# Patient Record
(demographics unavailable — no encounter records)

---

## 2024-10-23 NOTE — ASSESSMENT
[Carbohydrate Consistent Diet] : carbohydrate consistent diet [Denosumab Therapy] : Risks  and benefits of denosumab therapy were discussed with the patient including eczema, cellulitis, osteonecrosis of the jaw and atypical femur fractures [FreeTextEntry1] : 64 y.o female with h/o postmenopausal osteoporosis, prediabetes with obesity, MNG and vitamin D def.  1. Postmenopausal osteoporosis- DEXA scan performed in 11/2023 shows spine -1.6 with 8.2% increase compared to 2022, left femoral neck 0.4 and total hip 0.7 which are stable and 1/3 radius -1.7 which is a 3.9% increase compared to 2022. Recommend medical therapy and will continue Prolia since tolerating medication. Prolia given today from office supply. Reviewed risks and benefits of Prolia including ONJ and atypical femur fractures. Also stressed importance of avoiding abrupt discontinuation of Prolia given risk of bone loss and vertebral fractures. Encouraged weight bearing activity. Discussed appropriate calcium and vitamin D intake. Repeat DEXA scan in 11/2025.  2. Prediabetes with obesity- Encouraged a carbohydrate consistent diet and exercise. Intolerant to Victoza (GLP-1 agonist). Intolerant to phentermine and topiramate. Contrave was ineffective. Will continue Zepbound 10 mg SQ weekly. Will follow up with weight loss management program.  3. MNG- Patient is euthyroid. Will continue to monitor for now. Will repeat thyroid ultrasound in 2025.  4. Vitamin D def- Normal 25 vitamin D level and will continue current supplement.  5. Adrenal nodule- Discussed pathophysiology. No clinical evidence of endocrinopathy. Will perform 1 mg overnight dexamethasone suppression test to rule out Cushings. Will check plasma metanephrines to rule out pheochromocytoma. Will check serum aldosterone and plasma renin to rule out hyperaldosteronism. Will check serum DHEAS to rule out hyperandrogenism. Will check CT scan with adrenal protocol.   Follow up in 6 months for Prolia

## 2024-10-23 NOTE — HISTORY OF PRESENT ILLNESS
[FreeTextEntry1] : 64 y.o. female with h/o postmenopausal osteoporosis, MNG and obesity with prediabetes here for follow up visit.   Had ventral and umbilical hernia repair in January 2023  In the evaluation of her hernia: CT scan discovered incidental left adrenal nodule.   CT scan on 1/4/23 shows left 1 cm indeterminate adrenal nodule  Had rectal polyp and needed resection for high grade dysplasia. C/o hot flashes. Tried Gabapentin and Paxil without help. Did not tolerate Effexor. Doing Horizant which has been helping. Had prolonged PTT and tested positive for anticardiolipin antibody.   In regard to MNG, reports sometimes food getting stuck when swallowing. C/o fatigue.   Had FNA of left nodule in 3/2012 c/w colloid nodule and 2 right nodules in 3/2011 which were benign.   Thyroid ultrasound on 3/6/17 showed stable b/l multiple nodules with largest on the right 1.1 cm. Largest on the left is 1.0 cm and new nodule 0.4 cm on the isthmus.  Thyroid ultrasound on 8/17/18 shows MNG with stable nodules largest on the right is 1.2 cm and on the left is 1.2 cm with no abnormal LNs.  Had CT scan on 11/2/18 with no tracheal narrowing or encroachment upon the esophagus.  Thyroid ultrasound in August 2019 showed enlarged gland with heterogenous echotexture with stable b/l nodules largest on the right measuring 1.2 cm in the midpole and largest on the left measuring 0.94 cm in the upper pole. No abnormal LNs are seen. Isthmus nodule is no longer visualized.  Thyroid ultrasound on 2/23/22 showed right 1.3 cm nodule with echogenic foci (TR5), right 1.2 cm lower pole nodule with echogenic foci (TR5), and dominant left 1.1 cm cystic nodule in the upper pole. No abnormal LNs are seen. Thyroid ultrasound in December 2023 showed stable scattered right spongiform benign appearing nodules and colloid cysts measuring up to 1.7 cm in the mid pole (TR1) and stable scattered left benign appearing spongiform nodules and colloid cysts measuring up to 1.6 cm in the mid pole (TR1) no abnormal LNs.     In regard to osteoporosis, was on a drug holiday from May 2015 until March 2018. Treated with Alendronate from 2/2011 through 5/2015. Restarted Alendronate 70 mg po weekly in March 2018 and then stopped secondary to indigestion in October 2019.   Started Prolia in March 2020 and tolerating it. Had fall in 2020 and developed left knee meniscus tear. But no fractures. Taking calcium with vitamin D 1 daily. UTD with dentist and no issues now. No jaw or bone pain. Doing pilates and small group .  DEXA scan on 2/23/15 shows spine -2.1, and total hip 0.5, 1/3 radius -1.2.  DEXA scan in 3/2017 shows stable spine -2.2, left femoral neck -0.5 and total hip 0.3 and 1/3 radius -1.5.   DEXA scan in 3/2018 spine -2.4 with a 2.4% decrease compared to 2015, left femoral neck -0.3 and total hip which is stable and 1/3 radius -2.0 which is 4.6% decrease. DEXA scan in April 2019 shows spine -2.1 with a 3.8% increase compared to 2018, left femoral neck 0.2 with 6.6% increase and total hip 0.6 which is stable and 1/3 radius -1.9 which is stable. DEXA scan in January 2022 shows spine -2.2 with a 1.4% decrease so stable compared to 2019, left femoral neck 0.3 with 1.3% increase and total hip 0.6 which is stable and 1/3 radius -2.1 which is stable compared to 2019.  DEXA scan in November 2023 shows spine -1.6 with 8.2% increase, left femoral neck 0.4 and total hip 0.7 which is stable and 1/3 radius -1.7 which is a 3.9% increase.     In regard to prediabetes, diet could be better. She was seeing nutritionist. Tried Victoza but developed indigestion and stopped it. Had sleep medicine evaluation with moderate BEAU. Using CPAP.  In regard to obesity, seeing weight management program. Tried Contrave for 1 to 2 months but did not help. Tried phentermine and topiramate but stopped secondary to feeling jittery. Tried Wegovy 0.5 mg SQ weekly and did well with it and lost 10 to 15 pounds. However had difficulty getting it secondary to shortage.  Taking Zepbound 10 mg SQ weekly and has lost 40 pounds  Walking now.   UTD with cardiology. Had CAC score upper 100s. Taking Rosuvastatin 10 mg daily. Also takes HCTZ 12.5 mg daily.

## 2024-10-23 NOTE — PHYSICAL EXAM
[Alert] : alert [No Acute Distress] : no acute distress [Normal Sclera/Conjunctiva] : normal sclera/conjunctiva [EOMI] : extra ocular movement intact [No Respiratory Distress] : no respiratory distress [Clear to Auscultation] : lungs were clear to auscultation bilaterally [Normal S1, S2] : normal S1 and S2 [Regular Rhythm] : with a regular rhythm [No Edema] : no peripheral edema [Normal Bowel Sounds] : normal bowel sounds [Not Tender] : non-tender [Not Distended] : not distended [Soft] : abdomen soft [Normal Anterior Cervical Nodes] : no anterior cervical lymphadenopathy [No Spinal Tenderness] : no spinal tenderness [No Clubbing, Cyanosis] : no clubbing  or cyanosis of the fingernails [No Rash] : no rash [Normal Reflexes] : deep tendon reflexes were 2+ and symmetric [Normal Affect] : the affect was normal [Normal Mood] : the mood was normal [Kyphosis] : no kyphosis present [de-identified] : PEMA

## 2024-10-23 NOTE — DATA REVIEWED
[FreeTextEntry1] : Labs 10/11/24 25 vitamin D 49 chol 116 HDL 46 LDL 53 tri 85 glucose 77 BUN/cr 12/.89 calcium 9.8 LFTs normal Hba1c 5.7% TSH 0.60 Free T4 1.3 H/H 13.8/42.3   3/2/18 chol 234 tri 171 HDL 48  Hba1c 5.9% calcium 9.8 BUN/cr 14/0.9  2/1/19 chol 221 tri 154 HDL 48  glucose 111  calcium 9.9 BUn/cr 15/0.9 9/2018 Hba1c 5.6% TSH 0.97

## 2024-10-23 NOTE — PHYSICAL EXAM
[Alert] : alert [No Acute Distress] : no acute distress [Normal Sclera/Conjunctiva] : normal sclera/conjunctiva [EOMI] : extra ocular movement intact [No Respiratory Distress] : no respiratory distress [Clear to Auscultation] : lungs were clear to auscultation bilaterally [Normal S1, S2] : normal S1 and S2 [Regular Rhythm] : with a regular rhythm [No Edema] : no peripheral edema [Normal Bowel Sounds] : normal bowel sounds [Not Tender] : non-tender [Not Distended] : not distended [Soft] : abdomen soft [Normal Anterior Cervical Nodes] : no anterior cervical lymphadenopathy [No Spinal Tenderness] : no spinal tenderness [No Clubbing, Cyanosis] : no clubbing  or cyanosis of the fingernails [No Rash] : no rash [Normal Reflexes] : deep tendon reflexes were 2+ and symmetric [Normal Affect] : the affect was normal [Normal Mood] : the mood was normal [Kyphosis] : no kyphosis present [de-identified] : PEMA

## 2024-10-23 NOTE — REASON FOR VISIT
[Follow - Up] : a follow-up visit [Osteoporosis] : osteoporosis [Thyroid nodule/ MNG] : thyroid nodule/ MNG [Weight Management/Obesity] : weight management/obesity

## 2024-10-23 NOTE — REVIEW OF SYSTEMS
[Fatigue] : fatigue [Dry Eyes] : dryness [Dysphagia] : dysphagia [Heartburn] : heartburn [Joint Pain] : joint pain [Dry Skin] : dry skin [Headaches] : headaches [Hot Flashes] : hot flashes [Swelling] : swelling [Negative] : Respiratory [Recent Weight Gain (___ Lbs)] : no recent weight gain [Recent Weight Loss (___ Lbs)] : recent weight loss: [unfilled] lbs [Neck Pain] : no neck pain [Dysphonia] : no dysphonia [Polyuria] : no polyuria [Hair Loss] : no hair loss [Polydipsia] : no polydipsia

## 2024-10-25 NOTE — ASSESSMENT
[FreeTextEntry1] : 64F PMH obesity, binge-eating, HTN, HLD, NAFLD, BEAU on CPAP, GERD, IBS, fibromyalgia, migraines who presents to obesity medicine for follow-up.  # Obesity c/b bingeing, HLD, NAFLD, BEAU, migraines and fibromyalgia: Weight today 189.5 lbs, BMI 29.04, is down 15 lbs since last visit 3 months ago, and >50 lbs, since initial visit. Doing very well on Zepbound 10 mg/wk, has some injection site rxn and constipation but both are manageable. Dietary habits improved and much more control over binge habits, has been consistent with exercise but will be able to do more now that she finished studying for her exams.  - C/w Nutrition follow-up - Continue to replace snack foods with healthier alternatives. - C/w CBT - Continue to build exercise.  - C/w CPAP - C/w Zepbound 10 mg/wk - Hydrate adequately, monitor BP, may benefit from switching from or stopping hctz if able, f/u with PMD or cardiology - Tx HTN, HLD, NAFLD, BEAU, and comorbidities through weight loss, diet, exercise - Discussed changes in insurance coming up - F/u in 2-3 months.   Z...OopOz -->W, S/Top

## 2024-10-25 NOTE — HISTORY OF PRESENT ILLNESS
[FreeTextEntry1] : 64F PMH obesity, binge-eating, HLD, NAFLD, BEAU on CPAP, GERD, IBS, fibromyalgia, migraines who presents to weight management for follow-up.  She initially presented 11/2021 reporting hx of weight gain after marriage and particularly after delivering twins in her early 40's, and noted recent weight gain on Gabapentin.  She had tried Saxenda in the past and discontinued after first dose due to nausea. She had also been on topiramate and wellbutrin at various times and was not certain of side effects. She had also tried Vyvance 10 mg. Tried Qsymia but had side effects Was unable to get medication for some time and eventually started on Zepbound   Weight today: 189.5 lbs, BMI 29.04 Weight at Last Visit (7/24/24): 204 lbs, BMI 31.02 Weight (5/17/24): 214 lbs, BMI 32.54 Weight (3/15/24): 225 lbs, BMI 34.21 Weight (1/26/24): 232 lbs, BMI 35.28 Weight (7/7/23): 226 lbs, BMI 35.4 Weight (12/21/22): 233 lbs, BMI 36.49 Weight at Initial Visit (11/22/21): 241 lbs, BMI 36.64  She and  retired. Finishing up work, been busy, but enjoying time. Moved.  She will be switching to medicare.  Medications: Has been on Zepbound 10 mg/wk, tolerating well, appetite is diminished, feels she can/wants to stop after. Has some injection site rxn managing with claritin and hydrocortisone cream.   Was exercising less, studying to renew PA. Planning to restart, pilates class, other class. Uses elliptical 2x/wk and walks 3-4x/wk. Getting a rower.   (Looking into personal training/classes, plans to increase exercise c/w healthy chocies when/if grazing.

## 2025-01-03 NOTE — ASSESSMENT
[FreeTextEntry1] : 65F PMH obesity, binge-eating, HTN, HLD, NAFLD, BEAU on CPAP, GERD, IBS, fibromyalgia, migraines who presents to obesity medicine for follow-up.  # Obesity c/b bingeing, HLD, NAFLD, BEAU, migraines and fibromyalgia: Weight today 185 lbs, BMI 28.13, down 4.5 lbs since last appointment ~10 weeks ago, and 56 lbs since initial visit. Doing well on Zepbound, taking 10 mg, tolerating and still losing. Trying to focus on healthier eating options. Has been less active but has access to lots of exercise equipment.  - C/w Nutrition follow-up - Continue to replace snack foods with healthier alternatives. - C/w CBT - Continue to build exercise.  - C/w CPAP - C/w Zepbound 10 mg/wk - Hydrate adequately, monitor BP, may benefit from switching from or stopping hctz if able, f/u with PMD or cardiology - Tx HTN, HLD, NAFLD, BEAU, and comorbidities through weight loss, diet, exercise - Discussed changes in insurance coming up - F/u in 2-3 months.   Z...Allyson -->W, S/Top

## 2025-01-03 NOTE — HISTORY OF PRESENT ILLNESS
[Home] : at home, [unfilled] , at the time of the visit. [Medical Office: ([facility])___] : at the medical office located in  [Verbal consent obtained from patient] : the patient, [unfilled] [FreeTextEntry1] : 65F PMH obesity, binge-eating, HLD, NAFLD, BEAU on CPAP, GERD, IBS, fibromyalgia, migraines who presents to weight management for follow-up.  She initially presented 11/2021 reporting hx of weight gain after marriage and particularly after delivering twins in her early 40's, and noted recent weight gain on Gabapentin.  She had tried Saxenda in the past and discontinued after first dose due to nausea. She had also been on topiramate and wellbutrin at various times and was not certain of side effects. She had also tried Vyvance 10 mg. Tried Qsymia but had side effects Was unable to get medication for some time and eventually started on Zepbound   Weight today: 185 lbs, BMI 28.13 Weight at Last Visit (10/25/24): 189.5 lbs, BMI 29.04 Weight (7/24/24): 204 lbs, BMI 31.02 Weight (5/17/24): 214 lbs, BMI 32.54 Weight (3/15/24): 225 lbs, BMI 34.21 Weight (1/26/24): 232 lbs, BMI 35.28 Weight (7/7/23): 226 lbs, BMI 35.4 Weight (12/21/22): 233 lbs, BMI 36.49 Weight at Initial Visit (11/22/21): 241 lbs, BMI 36.64  (She and  retired. Finishing up work, been busy, but enjoying time. Moved.  She will be switching to medicare.  Medications: Takes Zepbound 10 mg q10-12 days, constipation has improved. Mild injection site rxn managing with claritin and hydrocortisone cream.   Trying to pick healthy options when she 'grazes'.   Walking has been less with weather. But has lots of equipment in home and a gym nearby.   (Looking into personal training/classes, plans to increase exercise c/w healthy chocies when/if grazing.

## 2025-01-03 NOTE — ASSESSMENT
On March the 1st this was sent back to Camila   [FreeTextEntry1] : 65F PMH obesity, binge-eating, HTN, HLD, NAFLD, BEAU on CPAP, GERD, IBS, fibromyalgia, migraines who presents to obesity medicine for follow-up.  # Obesity c/b bingeing, HLD, NAFLD, BEAU, migraines and fibromyalgia: Weight today 185 lbs, BMI 28.13, down 4.5 lbs since last appointment ~10 weeks ago, and 56 lbs since initial visit. Doing well on Zepbound, taking 10 mg, tolerating and still losing. Trying to focus on healthier eating options. Has been less active but has access to lots of exercise equipment.  - C/w Nutrition follow-up - Continue to replace snack foods with healthier alternatives. - C/w CBT - Continue to build exercise.  - C/w CPAP - C/w Zepbound 10 mg/wk - Hydrate adequately, monitor BP, may benefit from switching from or stopping hctz if able, f/u with PMD or cardiology - Tx HTN, HLD, NAFLD, BEAU, and comorbidities through weight loss, diet, exercise - Discussed changes in insurance coming up - F/u in 2-3 months.   Z...Allyson -->W, S/Top

## 2025-01-03 NOTE — HISTORY OF PRESENT ILLNESS
[Home] : at home, [unfilled] , at the time of the visit. [Medical Office: ([facility])___] : at the medical office located in  [Verbal consent obtained from patient] : the patient, [unfilled] [FreeTextEntry1] : 65F PMH obesity, binge-eating, HLD, NAFLD, BEAU on CPAP, GERD, IBS, fibromyalgia, migraines who presents to weight management for follow-up.  She initially presented 11/2021 reporting hx of weight gain after marriage and particularly after delivering twins in her early 40's, and noted recent weight gain on Gabapentin.  She had tried Saxenda in the past and discontinued after first dose due to nausea. She had also been on topiramate and wellbutrin at various times and was not certain of side effects. She had also tried Vyvance 10 mg. Tried Qsymia but had side effects Was unable to get medication for some time and eventually started on Zepbound   Weight today: 185 lbs, BMI 28.13 Weight at Last Visit (10/25/24): 189.5 lbs, BMI 29.04 Weight (7/24/24): 204 lbs, BMI 31.02 Weight (5/17/24): 214 lbs, BMI 32.54 Weight (3/15/24): 225 lbs, BMI 34.21 Weight (1/26/24): 232 lbs, BMI 35.28 Weight (7/7/23): 226 lbs, BMI 35.4 Weight (12/21/22): 233 lbs, BMI 36.49 Weight at Initial Visit (11/22/21): 241 lbs, BMI 36.64  (She and  retired. Finishing up work, been busy, but enjoying time. Moved.  She will be switching to medicare.  Medications: Takes Zepbound 10 mg q10-12 days, constipation has improved. Mild injection site rxn managing with claritin and hydrocortisone cream.   Trying to pick healthy options when she 'grazes'.   Walking has been less with weather. But has lots of equipment in home and a gym nearby.   (Looking into personal training/classes, plans to increase exercise c/w healthy chocies when/if grazing.

## 2025-01-03 NOTE — PHYSICAL EXAM
[Obese, well nourished, in no acute distress] : obese, well nourished, in no acute distress [de-identified] : TEB visit

## 2025-03-05 NOTE — PHYSICAL EXAM
[Obese, well nourished, in no acute distress] : obese, well nourished, in no acute distress [de-identified] : TEB visit

## 2025-03-05 NOTE — ASSESSMENT
[FreeTextEntry1] : 65F PMH obesity, binge-eating, HTN, HLD, NAFLD, BEAU on CPAP, GERD, IBS, fibromyalgia, migraines who presents to obesity medicine for follow-up.  # Obesity c/b bingeing, HLD, NAFLD, BEAU, migraines and fibromyalgia: Weight today 183.5 lbs, BMI 27.9, weight stable, just down a couple of pounds. Maintaining nearly 60 lbs from initial visit. Taking Zepbound 10 mg q10-12 days, spreads out dose because she's running out and cost. Some grazing still. Exercise has increased.  - C/w Nutrition follow-up - Continue to replace snack foods with healthier alternatives. - C/w CBT - Continue to build exercise.  - C/w CPAP - C/w Zepbound 10 mg, for BEAU - She will send sleep study report and will prescribe Zepbound for BEAU.  - Hydrate adequately, monitor BP, may benefit from switching from or stopping hctz if able, f/u with PMD or cardiology - Tx HTN, HLD, NAFLD, BEAU, and comorbidities through weight loss, diet, exercise - Discussed changes in insurance coming up - F/u in 2 months.   Z...OopOz -->W, S/Top

## 2025-03-05 NOTE — HISTORY OF PRESENT ILLNESS
[Home] : at home, [unfilled] , at the time of the visit. [Medical Office: ([facility])___] : at the medical office located in  [Verbal consent obtained from patient] : the patient, [unfilled] [FreeTextEntry1] : 65F PMH obesity, binge-eating, HLD, NAFLD, BEAU on CPAP, GERD, IBS, fibromyalgia, migraines who presents to weight management for follow-up.  She initially presented 11/2021 reporting hx of weight gain after marriage and particularly after delivering twins in her early 40's, and noted recent weight gain on Gabapentin.  She had tried Saxenda in the past and discontinued after first dose due to nausea. She had also been on topiramate and wellbutrin at various times and was not certain of side effects. She had also tried Vyvance 10 mg. Tried Qsymia but had side effects Was unable to get medication for some time and eventually started on Zepbound   Weight today: 183.5 lbs, BMI 27.9 Weight at Last Visit (1/3/25): 185 lbs, BMI 28.13 Weight (10/25/24): 189.5 lbs, BMI 29.04 Weight (7/24/24): 204 lbs, BMI 31.02 Weight (5/17/24): 214 lbs, BMI 32.54 Weight (3/15/24): 225 lbs, BMI 34.21 Weight (1/26/24): 232 lbs, BMI 35.28 Weight (7/7/23): 226 lbs, BMI 35.4 Weight (12/21/22): 233 lbs, BMI 36.49 Weight at Initial Visit (11/22/21): 241 lbs, BMI 36.64  Medications: Takes Zepbound 10 mg q10-12 days, constipation has improved. Mild injection site rxn managing with claritin and hydrocortisone cream.   Still struggles with some grazing. Trying to pick healthy options when she 'grazes'.   Exercising 30 min a few x/wk now. Walking when weather is better. Has lots of equipment in home and a gym nearby.

## 2025-03-05 NOTE — HISTORY OF PRESENT ILLNESS
[Home] : at home, [unfilled] , at the time of the visit. [Medical Office: ([facility])___] : at the medical office located in  [Verbal consent obtained from patient] : the patient, [unfilled] [FreeTextEntry1] : 65F PMH obesity, binge-eating, HLD, NAFLD, BEAU on CPAP, GERD, IBS, fibromyalgia, migraines who presents to weight management for follow-up.  She initially presented 11/2021 reporting hx of weight gain after marriage and particularly after delivering twins in her early 40's, and noted recent weight gain on Gabapentin.  She had tried Saxenda in the past and discontinued after first dose due to nausea. She had also been on topiramate and wellbutrin at various times and was not certain of side effects. She had also tried Vyvance 10 mg. Tried Qsymia but had side effects Was unable to get medication for some time and eventually started on Zepbound   Weight today: 183.5 lbs, BMI 27.9 Weight at Last Visit (1/3/25): 185 lbs, BMI 28.13 Weight (10/25/24): 189.5 lbs, BMI 29.04 Weight (7/24/24): 204 lbs, BMI 31.02 Weight (5/17/24): 214 lbs, BMI 32.54 Weight (3/15/24): 225 lbs, BMI 34.21 Weight (1/26/24): 232 lbs, BMI 35.28 Weight (7/7/23): 226 lbs, BMI 35.4 Weight (12/21/22): 233 lbs, BMI 36.49 Weight at Initial Visit (11/22/21): 241 lbs, BMI 36.64  Medications: Takes Zepbound 10 mg q10-12 days, constipation has improved. Mild injection site rxn managing with claritin and hydrocortisone cream.   Still struggles with some grazing. Trying to pick healthy options when she 'grazes'.   Exercising 30 min a few x/wk now. Walking when weather is better. Has lots of equipment in home and a gym nearby.

## 2025-03-05 NOTE — PHYSICAL EXAM
[Obese, well nourished, in no acute distress] : obese, well nourished, in no acute distress [de-identified] : TEB visit

## 2025-04-28 NOTE — PHYSICAL EXAM
[Alert] : alert [No Acute Distress] : no acute distress [Normal Sclera/Conjunctiva] : normal sclera/conjunctiva [EOMI] : extra ocular movement intact [No Respiratory Distress] : no respiratory distress [Clear to Auscultation] : lungs were clear to auscultation bilaterally [Normal S1, S2] : normal S1 and S2 [Regular Rhythm] : with a regular rhythm [No Edema] : no peripheral edema [Normal Bowel Sounds] : normal bowel sounds [Not Tender] : non-tender [Not Distended] : not distended [Soft] : abdomen soft [Normal Anterior Cervical Nodes] : no anterior cervical lymphadenopathy [No Spinal Tenderness] : no spinal tenderness [No Clubbing, Cyanosis] : no clubbing  or cyanosis of the fingernails [No Rash] : no rash [Normal Reflexes] : deep tendon reflexes were 2+ and symmetric [Normal Affect] : the affect was normal [Normal Mood] : the mood was normal [Kyphosis] : no kyphosis present [de-identified] : PEMA [de-identified] : b/l edema and varicose veins noted

## 2025-04-28 NOTE — HISTORY OF PRESENT ILLNESS
[FreeTextEntry1] : 65 y.o. female with h/o postmenopausal osteoporosis, MNG and obesity with prediabetes here for follow up visit.   Had ventral and umbilical hernia repair in January 2023  In the evaluation of her hernia: CT scan discovered incidental left adrenal nodule.   CT scan on 1/4/23 shows left 1 cm indeterminate adrenal nodule CT scan on 11/6/24 shows stable left 1 cm adrenal nodule with HU of 22, 99 and 49 on the 3 phases. C/w benign adenoma.   Adrenal hormone testing was normal in 11/2024  Had rectal polyp and needed resection for high grade dysplasia. C/o hot flashes. Tried Gabapentin and Paxil without help. Did not tolerate Effexor. Doing Horizant which has been helping. Had prolonged PTT and tested positive for anticardiolipin antibody.   In regard to MNG, reports sometimes food getting stuck when swallowing. C/o fatigue.   Had FNA of left nodule in 3/2012 c/w colloid nodule and 2 right nodules in 3/2011 which were benign.   Thyroid ultrasound on 3/6/17 showed stable b/l multiple nodules with largest on the right 1.1 cm. Largest on the left is 1.0 cm and new nodule 0.4 cm on the isthmus.  Thyroid ultrasound on 8/17/18 shows MNG with stable nodules largest on the right is 1.2 cm and on the left is 1.2 cm with no abnormal LNs.  Had CT scan on 11/2/18 with no tracheal narrowing or encroachment upon the esophagus.  Thyroid ultrasound in August 2019 showed enlarged gland with heterogenous echotexture with stable b/l nodules largest on the right measuring 1.2 cm in the midpole and largest on the left measuring 0.94 cm in the upper pole. No abnormal LNs are seen. Isthmus nodule is no longer visualized.  Thyroid ultrasound on 2/23/22 showed right 1.3 cm nodule with echogenic foci (TR5), right 1.2 cm lower pole nodule with echogenic foci (TR5), and dominant left 1.1 cm cystic nodule in the upper pole. No abnormal LNs are seen. Thyroid ultrasound in December 2023 showed stable scattered right spongiform benign appearing nodules and colloid cysts measuring up to 1.7 cm in the mid pole (TR1) and stable scattered left benign appearing spongiform nodules and colloid cysts measuring up to 1.6 cm in the mid pole (TR1) no abnormal LNs.     In regard to osteoporosis, was on a drug holiday from May 2015 until March 2018. Treated with Alendronate from 2/2011 through 5/2015. Restarted Alendronate 70 mg po weekly in March 2018 and then stopped secondary to indigestion in October 2019.   Started Prolia in March 2020 and tolerating it. Had fall in 2020 and developed left knee meniscus tear. But no fractures. Taking calcium with vitamin D 1 daily. UTD with dentist and no issues now. No jaw or bone pain. Doing pilates and small group .  DEXA scan on 2/23/15 shows spine -2.1, and total hip 0.5, 1/3 radius -1.2.  DEXA scan in 3/2017 shows stable spine -2.2, left femoral neck -0.5 and total hip 0.3 and 1/3 radius -1.5.   DEXA scan in 3/2018 spine -2.4 with a 2.4% decrease compared to 2015, left femoral neck -0.3 and total hip which is stable and 1/3 radius -2.0 which is 4.6% decrease. DEXA scan in April 2019 shows spine -2.1 with a 3.8% increase compared to 2018, left femoral neck 0.2 with 6.6% increase and total hip 0.6 which is stable and 1/3 radius -1.9 which is stable. DEXA scan in January 2022 shows spine -2.2 with a 1.4% decrease so stable compared to 2019, left femoral neck 0.3 with 1.3% increase and total hip 0.6 which is stable and 1/3 radius -2.1 which is stable compared to 2019.  DEXA scan in November 2023 shows spine -1.6 with 8.2% increase, left femoral neck 0.4 and total hip 0.7 which is stable and 1/3 radius -1.7 which is a 3.9% increase.     In regard to prediabetes, diet could be better. She was seeing nutritionist. Tried Victoza but developed indigestion and stopped it. Had sleep medicine evaluation with moderate BEAU. Using CPAP.  In regard to obesity, seeing weight management program. Tried Contrave for 1 to 2 months but did not help. Tried phentermine and topiramate but stopped secondary to feeling jittery. Tried Wegovy 0.5 mg SQ weekly and did well with it and lost 10 to 15 pounds. However had difficulty getting it secondary to shortage.  Taking Zepbound 10 mg SQ weekly and has lost 40 pounds.  Walking now.   UTD with cardiology. Had CAC score upper 100s. Taking Rosuvastatin 10 mg daily.

## 2025-04-28 NOTE — DATA REVIEWED
[FreeTextEntry1] : Labs 4/16/25 iron 67 chol 141 HDL 51 LDL 71 tri 100 glucose 82 BUN/cr 16/.80 calcium 9.5 Hba1c 5.6% H/H 13.7/41.8 vitamin B12 571 25 vitamin D 50    10/11/24 25 vitamin D 49 chol 116 HDL 46 LDL 53 tri 85 glucose 77 BUN/cr 12/.89 calcium 9.8 LFTs normal Hba1c 5.7% TSH 0.60 Free T4 1.3 H/H 13.8/42.3   3/2/18 chol 234 tri 171 HDL 48  Hba1c 5.9% calcium 9.8 BUN/cr 14/0.9  2/1/19 chol 221 tri 154 HDL 48  glucose 111  calcium 9.9 BUn/cr 15/0.9 9/2018 Hba1c 5.6% TSH 0.97

## 2025-04-28 NOTE — REVIEW OF SYSTEMS
[Fatigue] : fatigue [Recent Weight Loss (___ Lbs)] : recent weight loss: [unfilled] lbs [Dry Eyes] : dryness [Dysphagia] : dysphagia [Heartburn] : heartburn [Joint Pain] : joint pain [Dry Skin] : dry skin [Headaches] : headaches [Hot Flashes] : hot flashes [Swelling] : swelling [Negative] : Respiratory [Recent Weight Gain (___ Lbs)] : no recent weight gain [Neck Pain] : no neck pain [Dysphonia] : no dysphonia [Polyuria] : no polyuria [Hair Loss] : no hair loss [Polydipsia] : no polydipsia

## 2025-04-28 NOTE — ASSESSMENT
[Carbohydrate Consistent Diet] : carbohydrate consistent diet [Denosumab Therapy] : Risks  and benefits of denosumab therapy were discussed with the patient including eczema, cellulitis, osteonecrosis of the jaw and atypical femur fractures [FreeTextEntry1] : 65 y.o female with h/o postmenopausal osteoporosis, prediabetes with obesity, MNG and vitamin D def.  1. Postmenopausal osteoporosis- DEXA scan performed in 11/2023 shows spine -1.6 with 8.2% increase compared to 2022, left femoral neck 0.4 and total hip 0.7 which are stable and 1/3 radius -1.7 which is a 3.9% increase compared to 2022. Recommend medical therapy and will continue Prolia since tolerating medication. Prolia given today from office supply. Reviewed risks and benefits of Prolia including ONJ and atypical femur fractures. Also stressed importance of avoiding abrupt discontinuation of Prolia given risk of bone loss and vertebral fractures. Encouraged weight bearing activity. Discussed appropriate calcium and vitamin D intake. Repeat DEXA scan in 11/2025.  2. Prediabetes with obesity- Encouraged a carbohydrate consistent diet and exercise. Intolerant to Victoza (GLP-1 agonist). Intolerant to phentermine and topiramate. Contrave was ineffective. Will continue Zepbound 10 mg SQ weekly. Will follow up with weight loss management program.  3. MNG- Patient is euthyroid. Will continue to monitor for now. Will repeat thyroid ultrasound in 2025.  4. Vitamin D def- Normal 25 vitamin D level and will continue current supplement.  5. Adrenal nodule- Discussed pathophysiology. No clinical evidence of endocrinopathy. Had normal 1 mg overnight dexamethasone suppression test to rule out Cushings. Had normal plasma metanephrines to rule out pheochromocytoma. Had normal serum aldosterone and plasma renin to rule out hyperaldosteronism. Had normal serum DHEAS to rule out hyperandrogenism. Will repeat hormone testing in in the fall of 2025. UTD with CT scan with adrenal protocol in 11/2024 which was consistent with stable left 1 cm benign adenoma.   Follow up in 6 months for Prolia

## 2025-04-28 NOTE — PHYSICAL EXAM
[Alert] : alert [No Acute Distress] : no acute distress [Normal Sclera/Conjunctiva] : normal sclera/conjunctiva [EOMI] : extra ocular movement intact [No Respiratory Distress] : no respiratory distress [Clear to Auscultation] : lungs were clear to auscultation bilaterally [Normal S1, S2] : normal S1 and S2 [Regular Rhythm] : with a regular rhythm [No Edema] : no peripheral edema [Normal Bowel Sounds] : normal bowel sounds [Not Tender] : non-tender [Not Distended] : not distended [Soft] : abdomen soft [Normal Anterior Cervical Nodes] : no anterior cervical lymphadenopathy [No Spinal Tenderness] : no spinal tenderness [No Clubbing, Cyanosis] : no clubbing  or cyanosis of the fingernails [No Rash] : no rash [Normal Reflexes] : deep tendon reflexes were 2+ and symmetric [Normal Affect] : the affect was normal [Normal Mood] : the mood was normal [Kyphosis] : no kyphosis present [de-identified] : PEMA [de-identified] : b/l edema and varicose veins noted

## 2025-07-02 NOTE — HISTORY OF PRESENT ILLNESS
[Home] : at home, [unfilled] , at the time of the visit. [Medical Office: ([facility])___] : at the medical office located in  [Verbal consent obtained from patient] : the patient, [unfilled] [FreeTextEntry1] : 65F PMH obesity, binge-eating, HLD, NAFLD, BEAU on CPAP, GERD, IBS, fibromyalgia, migraines who presents to weight management for follow-up.  She initially presented 11/2021 reporting hx of weight gain after marriage and particularly after delivering twins in her early 40's, and noted recent weight gain on Gabapentin.  She had tried Saxenda in the past and discontinued after first dose due to nausea. She had also been on topiramate and wellbutrin at various times and was not certain of side effects. She had also tried Vyvance 10 mg. Tried Qsymia but had side effects Was unable to get medication for some time and eventually started on Zepbound   Weight today: 182 lbs, BMI 27.67 Weight at Last Visit (3/5/25): 183.5 lbs, BMI 27.9 Weight (1/3/25): 185 lbs, BMI 28.13 Weight (10/25/24): 189.5 lbs, BMI 29.04 Weight (7/24/24): 204 lbs, BMI 31.02 Weight (5/17/24): 214 lbs, BMI 32.54 Weight (3/15/24): 225 lbs, BMI 34.21 Weight (1/26/24): 232 lbs, BMI 35.28 Weight (7/7/23): 226 lbs, BMI 35.4 Weight (12/21/22): 233 lbs, BMI 36.49 Weight at Initial Visit (11/22/21): 241 lbs, BMI 36.64  Medications: Takes Zepbound 10 mg q10-12 days, constipation has improved. Mild injection site rxn managing with claritin and hydrocortisone cream when she remembers to take it ahead of time.   Eating less overall, still struggles with some cravings, sugar, grazing  Tries to do some activity most days (walk 30 min, elliptical 45 min), each 2x/wk. Feels less winded. Also feels like she 'isn't pushing it'.  (Exercising 30 min a few x/wk now. Walking when weather is better. Has lots of equipment in home and a gym nearby.

## 2025-07-02 NOTE — PHYSICAL EXAM
[Obese, well nourished, in no acute distress] : obese, well nourished, in no acute distress [de-identified] : TEB visit

## 2025-07-02 NOTE — ASSESSMENT
[FreeTextEntry1] : 65F PMH obesity, binge-eating, HTN, HLD, NAFLD, BEAU on CPAP, GERD, IBS, fibromyalgia, migraines who presents to obesity medicine for follow-up.  # Obesity c/b bingeing, HLD, NAFLD, moderate BEAU, migraines and fibromyalgia: Weight today 182 lbs, BMI 27.67, weight stable, slightly down from last appointment, significantly down from starting weight ~60 lbs loss. On Zepbound 10 mg/wk, tolerating well, some mild injection site reaction. Exercise more consistent. - C/w Nutrition follow-up - Continue to replace snack foods with healthier alternatives. - C/w CBT - Continue to build exercise.  - C/w CPAP - C/w Zepbound 10 mg for BEAU, will increase frequency to q7-8 days).  - Hydrate adequately, monitor BP, may benefit from switching from or stopping hctz if able, f/u with PMD or cardiology - Tx HTN, HLD, NAFLD, BEAU, and comorbidities through weight loss, diet, exercise - Discussed changes in insurance coming up - F/u in 2-3 months.   Z...Allyson -->W, S/Top

## 2025-07-02 NOTE — HISTORY OF PRESENT ILLNESS
[Home] : at home, [unfilled] , at the time of the visit. [Medical Office: ([facility])___] : at the medical office located in  [Verbal consent obtained from patient] : the patient, [unfilled] [FreeTextEntry1] : 65F PMH obesity, binge-eating, HLD, NAFLD, BEAU on CPAP, GERD, IBS, fibromyalgia, migraines who presents to weight management for follow-up.  She initially presented 11/2021 reporting hx of weight gain after marriage and particularly after delivering twins in her early 40's, and noted recent weight gain on Gabapentin.  She had tried Saxenda in the past and discontinued after first dose due to nausea. She had also been on topiramate and wellbutrin at various times and was not certain of side effects. She had also tried Vyvance 10 mg. Tried Qsymia but had side effects Was unable to get medication for some time and eventually started on Zepbound   Weight today: 182 lbs, BMI 27.67 Weight at Last Visit (3/5/25): 183.5 lbs, BMI 27.9 Weight (1/3/25): 185 lbs, BMI 28.13 Weight (10/25/24): 189.5 lbs, BMI 29.04 Weight (7/24/24): 204 lbs, BMI 31.02 Weight (5/17/24): 214 lbs, BMI 32.54 Weight (3/15/24): 225 lbs, BMI 34.21 Weight (1/26/24): 232 lbs, BMI 35.28 Weight (7/7/23): 226 lbs, BMI 35.4 Weight (12/21/22): 233 lbs, BMI 36.49 Weight at Initial Visit (11/22/21): 241 lbs, BMI 36.64  Medications: Takes Zepbound 10 mg q10-12 days, constipation has improved. Mild injection site rxn managing with claritin and hydrocortisone cream when she remembers to take it ahead of time.   Eating less overall, still struggles with some cravings, sugar, grazing  Tries to do some activity most days (walk 30 min, elliptical 45 min), each 2x/wk. Feels less winded. Also feels like she 'isn't pushing it'.  (Exercising 30 min a few x/wk now. Walking when weather is better. Has lots of equipment in home and a gym nearby.

## 2025-07-02 NOTE — PHYSICAL EXAM
[Obese, well nourished, in no acute distress] : obese, well nourished, in no acute distress [de-identified] : TEB visit